# Patient Record
Sex: MALE | Race: BLACK OR AFRICAN AMERICAN | NOT HISPANIC OR LATINO | Employment: FULL TIME | URBAN - METROPOLITAN AREA
[De-identification: names, ages, dates, MRNs, and addresses within clinical notes are randomized per-mention and may not be internally consistent; named-entity substitution may affect disease eponyms.]

---

## 2023-03-28 ENCOUNTER — HOSPITAL ENCOUNTER (OUTPATIENT)
Facility: HOSPITAL | Age: 23
Discharge: HOME | End: 2023-03-28
Attending: FAMILY MEDICINE | Admitting: INTERNAL MEDICINE
Payer: MEDICAID

## 2023-03-28 VITALS
OXYGEN SATURATION: 100 % | SYSTOLIC BLOOD PRESSURE: 131 MMHG | TEMPERATURE: 98 F | HEART RATE: 62 BPM | HEIGHT: 71 IN | RESPIRATION RATE: 18 BRPM | BODY MASS INDEX: 16.82 KG/M2 | DIASTOLIC BLOOD PRESSURE: 75 MMHG | WEIGHT: 120.13 LBS

## 2023-03-28 DIAGNOSIS — D64.9 SYMPTOMATIC ANEMIA: ICD-10-CM

## 2023-03-28 DIAGNOSIS — D64.9 NORMOCYTIC ANEMIA: Primary | ICD-10-CM

## 2023-03-28 DIAGNOSIS — R10.9 ABDOMINAL PAIN: ICD-10-CM

## 2023-03-28 DIAGNOSIS — R07.9 CHEST PAIN: ICD-10-CM

## 2023-03-28 PROBLEM — R31.9 HEMATURIA: Status: ACTIVE | Noted: 2023-03-28

## 2023-03-28 LAB
ABO + RH BLD: NORMAL
ABORH RETYPE: NORMAL
ALBUMIN SERPL-MCNC: 4.4 G/DL (ref 3.5–5)
ALBUMIN/GLOB SERPL: 1.4 RATIO (ref 1.1–2)
ALP SERPL-CCNC: 60 UNIT/L (ref 40–150)
ALT SERPL-CCNC: 17 UNIT/L (ref 0–55)
AMPHET UR QL SCN: NEGATIVE
APPEARANCE UR: ABNORMAL
AST SERPL-CCNC: 22 UNIT/L (ref 5–34)
BACTERIA #/AREA URNS AUTO: ABNORMAL /HPF
BARBITURATE SCN PRESENT UR: NEGATIVE
BASOPHILS # BLD AUTO: 0.06 X10(3)/MCL (ref 0–0.2)
BASOPHILS NFR BLD AUTO: 0.6 %
BENZODIAZ UR QL SCN: NEGATIVE
BILIRUB UR QL STRIP.AUTO: NEGATIVE MG/DL
BILIRUBIN DIRECT+TOT PNL SERPL-MCNC: 0.4 MG/DL
BLD PROD TYP BPU: NORMAL
BLOOD UNIT EXPIRATION DATE: NORMAL
BLOOD UNIT TYPE CODE: 5100
BUN SERPL-MCNC: 18.9 MG/DL (ref 8.9–20.6)
C TRACH DNA SPEC QL NAA+PROBE: NOT DETECTED
CALCIUM SERPL-MCNC: 9.4 MG/DL (ref 8.4–10.2)
CANNABINOIDS UR QL SCN: POSITIVE
CHLORIDE SERPL-SCNC: 104 MMOL/L (ref 98–107)
CK SERPL-CCNC: 160 U/L (ref 30–200)
CO2 SERPL-SCNC: 27 MMOL/L (ref 22–29)
COCAINE UR QL SCN: NEGATIVE
COLOR UR AUTO: ABNORMAL
CREAT SERPL-MCNC: 1.31 MG/DL (ref 0.73–1.18)
CROSSMATCH INTERPRETATION: NORMAL
CTP QC/QA: YES
DEPRECATED CALCIDIOL+CALCIFEROL SERPL-MC: 10.6 NG/ML (ref 30–80)
DISPENSE STATUS: NORMAL
EOSINOPHIL # BLD AUTO: 0.11 X10(3)/MCL (ref 0–0.9)
EOSINOPHIL NFR BLD AUTO: 1.1 %
ERYTHROCYTE [DISTWIDTH] IN BLOOD BY AUTOMATED COUNT: 15.6 % (ref 11.5–17)
FECAL OCCULT BLOOD, POC: NEGATIVE
FENTANYL UR QL SCN: NEGATIVE
FERRITIN SERPL-MCNC: 11.4 NG/ML (ref 21.81–274.66)
FOLATE SERPL-MCNC: 28.5 NG/ML (ref 7–31.4)
GFR SERPLBLD CREATININE-BSD FMLA CKD-EPI: >60 MLS/MIN/1.73/M2
GLOBULIN SER-MCNC: 3.1 GM/DL (ref 2.4–3.5)
GLUCOSE SERPL-MCNC: 107 MG/DL (ref 74–100)
GLUCOSE UR QL STRIP.AUTO: NORMAL MG/DL
GROUP & RH: NORMAL
HCT VFR BLD AUTO: 22.9 % (ref 42–52)
HGB BLD-MCNC: 7.3 G/DL (ref 14–18)
HGB S BLD QL SOLY: POSITIVE
HYALINE CASTS #/AREA URNS LPF: ABNORMAL /LPF
IMM GRANULOCYTES # BLD AUTO: 0.04 X10(3)/MCL (ref 0–0.04)
IMM GRANULOCYTES NFR BLD AUTO: 0.4 %
INDIRECT COOMBS GEL: NORMAL
IRON SATN MFR SERPL: 3 % (ref 20–50)
IRON SERPL-MCNC: 10 UG/DL (ref 65–175)
KETONES UR QL STRIP.AUTO: NEGATIVE MG/DL
LDH SERPL-CCNC: 163 U/L (ref 125–220)
LEUKOCYTE ESTERASE UR QL STRIP.AUTO: 75 UNIT/L
LIPASE SERPL-CCNC: 18 U/L
LYMPHOCYTES # BLD AUTO: 1.33 X10(3)/MCL (ref 0.6–4.6)
LYMPHOCYTES NFR BLD AUTO: 13.7 %
MCH RBC QN AUTO: 25.7 PG (ref 27–31)
MCHC RBC AUTO-ENTMCNC: 31.9 G/DL (ref 33–36)
MCV RBC AUTO: 80.6 FL (ref 80–94)
MDMA UR QL SCN: NEGATIVE
MONOCYTES # BLD AUTO: 0.78 X10(3)/MCL (ref 0.1–1.3)
MONOCYTES NFR BLD AUTO: 8 %
N GONORRHOEA DNA SPEC QL NAA+PROBE: NOT DETECTED
NEUTROPHILS # BLD AUTO: 7.42 X10(3)/MCL (ref 2.1–9.2)
NEUTROPHILS NFR BLD AUTO: 76.2 %
NITRITE UR QL STRIP.AUTO: NEGATIVE
NRBC BLD AUTO-RTO: 0 %
OPIATES UR QL SCN: NEGATIVE
PCP UR QL: NEGATIVE
PH UR STRIP.AUTO: 7 [PH]
PH UR: 7 [PH] (ref 3–11)
PLATELET # BLD AUTO: 303 X10(3)/MCL (ref 130–400)
PMV BLD AUTO: 10.7 FL (ref 7.4–10.4)
POTASSIUM SERPL-SCNC: 3.7 MMOL/L (ref 3.5–5.1)
PROT SERPL-MCNC: 7.5 GM/DL (ref 6.4–8.3)
PROT UR QL STRIP.AUTO: ABNORMAL MG/DL
RBC # BLD AUTO: 2.84 X10(6)/MCL (ref 4.7–6.1)
RBC #/AREA URNS AUTO: >100 /HPF
RBC UR QL AUTO: ABNORMAL UNIT/L
RET# (OHS): 0.05 (ref 0.03–0.1)
RETICULOCYTE COUNT AUTOMATED (OLG): 1.74 % (ref 1.1–2.1)
SODIUM SERPL-SCNC: 139 MMOL/L (ref 136–145)
SP GR UR STRIP.AUTO: 1.01
SPECIFIC GRAVITY, URINE AUTO (.000) (OHS): 1.01 (ref 1–1.03)
SPECIMEN OUTDATE: NORMAL
SQUAMOUS #/AREA URNS LPF: ABNORMAL /HPF
TIBC SERPL-MCNC: 374 UG/DL (ref 69–240)
TIBC SERPL-MCNC: 384 UG/DL (ref 250–450)
TRANSFERRIN SERPL-MCNC: 340 MG/DL (ref 174–364)
UNIDENT CRYS #/AREA URNS HPF: ABNORMAL /HPF
UNIT NUMBER: NORMAL
UROBILINOGEN UR STRIP-ACNC: NORMAL MG/DL
VIT B12 SERPL-MCNC: 715 PG/ML (ref 213–816)
WBC # SPEC AUTO: 9.7 X10(3)/MCL (ref 4.5–11.5)
WBC #/AREA URNS AUTO: ABNORMAL /HPF

## 2023-03-28 PROCEDURE — 83020 HEMOGLOBIN ELECTROPHORESIS: CPT | Mod: 91,90

## 2023-03-28 PROCEDURE — 86900 BLOOD TYPING SEROLOGIC ABO: CPT | Performed by: STUDENT IN AN ORGANIZED HEALTH CARE EDUCATION/TRAINING PROGRAM

## 2023-03-28 PROCEDURE — 87591 N.GONORRHOEAE DNA AMP PROB: CPT | Performed by: FAMILY MEDICINE

## 2023-03-28 PROCEDURE — 63600175 PHARM REV CODE 636 W HCPCS: Performed by: STUDENT IN AN ORGANIZED HEALTH CARE EDUCATION/TRAINING PROGRAM

## 2023-03-28 PROCEDURE — 96365 THER/PROPH/DIAG IV INF INIT: CPT | Mod: 59

## 2023-03-28 PROCEDURE — 36430 TRANSFUSION BLD/BLD COMPNT: CPT

## 2023-03-28 PROCEDURE — P9016 RBC LEUKOCYTES REDUCED: HCPCS

## 2023-03-28 PROCEDURE — 90472 IMMUNIZATION ADMIN EACH ADD: CPT

## 2023-03-28 PROCEDURE — 85025 COMPLETE CBC W/AUTO DIFF WBC: CPT | Performed by: FAMILY MEDICINE

## 2023-03-28 PROCEDURE — 96361 HYDRATE IV INFUSION ADD-ON: CPT

## 2023-03-28 PROCEDURE — 99285 EMERGENCY DEPT VISIT HI MDM: CPT | Mod: 25

## 2023-03-28 PROCEDURE — 90715 TDAP VACCINE 7 YRS/> IM: CPT | Performed by: INTERNAL MEDICINE

## 2023-03-28 PROCEDURE — 80307 DRUG TEST PRSMV CHEM ANLYZR: CPT | Performed by: STUDENT IN AN ORGANIZED HEALTH CARE EDUCATION/TRAINING PROGRAM

## 2023-03-28 PROCEDURE — 81001 URINALYSIS AUTO W/SCOPE: CPT | Mod: 59 | Performed by: FAMILY MEDICINE

## 2023-03-28 PROCEDURE — 82306 VITAMIN D 25 HYDROXY: CPT

## 2023-03-28 PROCEDURE — 25500020 PHARM REV CODE 255

## 2023-03-28 PROCEDURE — 90677 PCV20 VACCINE IM: CPT

## 2023-03-28 PROCEDURE — G0378 HOSPITAL OBSERVATION PER HR: HCPCS

## 2023-03-28 PROCEDURE — 87088 URINE BACTERIA CULTURE: CPT | Performed by: FAMILY MEDICINE

## 2023-03-28 PROCEDURE — 82728 ASSAY OF FERRITIN: CPT | Performed by: STUDENT IN AN ORGANIZED HEALTH CARE EDUCATION/TRAINING PROGRAM

## 2023-03-28 PROCEDURE — 85660 RBC SICKLE CELL TEST: CPT | Performed by: FAMILY MEDICINE

## 2023-03-28 PROCEDURE — 83615 LACTATE (LD) (LDH) ENZYME: CPT | Performed by: FAMILY MEDICINE

## 2023-03-28 PROCEDURE — 25000003 PHARM REV CODE 250: Performed by: FAMILY MEDICINE

## 2023-03-28 PROCEDURE — 83690 ASSAY OF LIPASE: CPT | Performed by: FAMILY MEDICINE

## 2023-03-28 PROCEDURE — 63600175 PHARM REV CODE 636 W HCPCS: Performed by: FAMILY MEDICINE

## 2023-03-28 PROCEDURE — 82607 VITAMIN B-12: CPT | Performed by: FAMILY MEDICINE

## 2023-03-28 PROCEDURE — 96375 TX/PRO/DX INJ NEW DRUG ADDON: CPT

## 2023-03-28 PROCEDURE — 82550 ASSAY OF CK (CPK): CPT | Performed by: STUDENT IN AN ORGANIZED HEALTH CARE EDUCATION/TRAINING PROGRAM

## 2023-03-28 PROCEDURE — 86920 COMPATIBILITY TEST SPIN: CPT

## 2023-03-28 PROCEDURE — 83020 HEMOGLOBIN ELECTROPHORESIS: CPT | Mod: 90

## 2023-03-28 PROCEDURE — 82746 ASSAY OF FOLIC ACID SERUM: CPT | Performed by: FAMILY MEDICINE

## 2023-03-28 PROCEDURE — 85060 BLOOD SMEAR INTERPRETATION: CPT | Performed by: STUDENT IN AN ORGANIZED HEALTH CARE EDUCATION/TRAINING PROGRAM

## 2023-03-28 PROCEDURE — 63600175 PHARM REV CODE 636 W HCPCS

## 2023-03-28 PROCEDURE — 63600175 PHARM REV CODE 636 W HCPCS: Performed by: INTERNAL MEDICINE

## 2023-03-28 PROCEDURE — 83550 IRON BINDING TEST: CPT | Performed by: FAMILY MEDICINE

## 2023-03-28 PROCEDURE — 80053 COMPREHEN METABOLIC PANEL: CPT | Performed by: FAMILY MEDICINE

## 2023-03-28 PROCEDURE — 85045 AUTOMATED RETICULOCYTE COUNT: CPT | Performed by: FAMILY MEDICINE

## 2023-03-28 PROCEDURE — 25000003 PHARM REV CODE 250: Performed by: STUDENT IN AN ORGANIZED HEALTH CARE EDUCATION/TRAINING PROGRAM

## 2023-03-28 PROCEDURE — 85660 RBC SICKLE CELL TEST: CPT | Mod: 91,90

## 2023-03-28 PROCEDURE — 90471 IMMUNIZATION ADMIN: CPT | Performed by: INTERNAL MEDICINE

## 2023-03-28 RX ORDER — GLUCAGON 1 MG
1 KIT INJECTION
Status: DISCONTINUED | OUTPATIENT
Start: 2023-03-28 | End: 2023-03-28 | Stop reason: HOSPADM

## 2023-03-28 RX ORDER — DEXTROSE 40 %
15 GEL (GRAM) ORAL
Status: DISCONTINUED | OUTPATIENT
Start: 2023-03-28 | End: 2023-03-28 | Stop reason: HOSPADM

## 2023-03-28 RX ORDER — DEXTROSE 40 %
30 GEL (GRAM) ORAL
Status: DISCONTINUED | OUTPATIENT
Start: 2023-03-28 | End: 2023-03-28 | Stop reason: HOSPADM

## 2023-03-28 RX ORDER — NALOXONE HCL 0.4 MG/ML
0.02 VIAL (ML) INJECTION
Status: DISCONTINUED | OUTPATIENT
Start: 2023-03-28 | End: 2023-03-28 | Stop reason: HOSPADM

## 2023-03-28 RX ORDER — HYDROCODONE BITARTRATE AND ACETAMINOPHEN 500; 5 MG/1; MG/1
TABLET ORAL
Status: DISCONTINUED | OUTPATIENT
Start: 2023-03-28 | End: 2023-03-28 | Stop reason: HOSPADM

## 2023-03-28 RX ORDER — SODIUM CHLORIDE 0.9 % (FLUSH) 0.9 %
10 SYRINGE (ML) INJECTION EVERY 12 HOURS PRN
Status: DISCONTINUED | OUTPATIENT
Start: 2023-03-28 | End: 2023-03-28 | Stop reason: HOSPADM

## 2023-03-28 RX ORDER — FERROUS SULFATE 325(65) MG
325 TABLET ORAL EVERY OTHER DAY
Qty: 45 TABLET | Refills: 1 | Status: SHIPPED | OUTPATIENT
Start: 2023-03-28 | End: 2023-09-24

## 2023-03-28 RX ORDER — KETOROLAC TROMETHAMINE 30 MG/ML
30 INJECTION, SOLUTION INTRAMUSCULAR; INTRAVENOUS
Status: COMPLETED | OUTPATIENT
Start: 2023-03-28 | End: 2023-03-28

## 2023-03-28 RX ADMIN — PNEUMOCOCCAL 20-VALENT CONJUGATE VACCINE 0.5 ML
2.2; 2.2; 2.2; 2.2; 2.2; 2.2; 2.2; 2.2; 2.2; 2.2; 2.2; 2.2; 2.2; 2.2; 2.2; 2.2; 4.4; 2.2; 2.2; 2.2 INJECTION, SUSPENSION INTRAMUSCULAR at 04:03

## 2023-03-28 RX ADMIN — SODIUM CHLORIDE 125 MG: 9 INJECTION, SOLUTION INTRAVENOUS at 01:03

## 2023-03-28 RX ADMIN — TETANUS TOXOID, REDUCED DIPHTHERIA TOXOID AND ACELLULAR PERTUSSIS VACCINE, ADSORBED 0.5 ML: 5; 2.5; 8; 8; 2.5 SUSPENSION INTRAMUSCULAR at 04:03

## 2023-03-28 RX ADMIN — KETOROLAC TROMETHAMINE 30 MG: 30 INJECTION, SOLUTION INTRAMUSCULAR at 01:03

## 2023-03-28 RX ADMIN — SODIUM CHLORIDE 1000 ML: 9 INJECTION, SOLUTION INTRAVENOUS at 01:03

## 2023-03-28 RX ADMIN — IOHEXOL 100 ML: 350 INJECTION, SOLUTION INTRAVENOUS at 02:03

## 2023-03-28 NOTE — LETTER
March 28, 2023         [address] [facility] [address]  Phone: [phone]  Fax: [phone]       Patient: Tim Pratt   YOB: 2000  Date of Visit: 03/28/2023    To Whom It May Concern:    Amee Pratt  was at [facility] on 03/28/2023. The patient may return to work on 3/29/23 with no restrictions. If you have any questions or concerns, or if I can be of further assistance, please do not hesitate to contact me.    Sincerely,    Lorenza Ramachandran RN

## 2023-03-28 NOTE — ED PROVIDER NOTES
Encounter Date: 3/28/2023       History     Chief Complaint   Patient presents with    Abdominal Pain     Pt reports to the ed c/o abdominal pain since 6 p.m. today. Denies nausea, vomiting, and diarrhea at this time. Ramez hair     21 y/o male presents to the ER with complaints of abdominal pain that began around 6pm last night.  Reports symptoms occurred after having a bowel movement (normal bowel movement).  No dysuria or hematuria.  Patient in left lower abdomen.  Describes as sharp pain.  No associated nausea or vomiting.  No fever or chills.    The history is provided by the patient.   Review of patient's allergies indicates:  No Known Allergies  History reviewed. No pertinent past medical history.  History reviewed. No pertinent surgical history.  History reviewed. No pertinent family history.     Review of Systems   Constitutional:  Negative for chills, fatigue and fever.   HENT:  Negative for ear pain, rhinorrhea and sore throat.    Eyes:  Negative for photophobia and pain.   Respiratory:  Negative for cough, shortness of breath and wheezing.    Cardiovascular:  Negative for chest pain.   Gastrointestinal:  Positive for abdominal pain. Negative for diarrhea, nausea and vomiting.   Genitourinary:  Negative for dysuria.   Neurological:  Negative for dizziness, weakness and headaches.   All other systems reviewed and are negative.    Physical Exam     Initial Vitals [03/28/23 0053]   BP Pulse Resp Temp SpO2   137/78 81 18 97.3 °F (36.3 °C) 100 %      MAP       --         Physical Exam    Nursing note and vitals reviewed.  Constitutional: He appears well-developed and well-nourished.   HENT:   Head: Normocephalic and atraumatic.   Eyes: EOM are normal. Pupils are equal, round, and reactive to light.   Neck: Neck supple.   Normal range of motion.  Cardiovascular:  Normal rate, regular rhythm, normal heart sounds and intact distal pulses.     Exam reveals no gallop and no friction rub.       No murmur  heard.  Pulmonary/Chest: Breath sounds normal. No respiratory distress.   Abdominal: Abdomen is soft. Bowel sounds are normal. He exhibits no distension. There is abdominal tenderness.   Mild left lower quadrant abdominal pain, no rebound or guarding   Musculoskeletal:         General: Normal range of motion.      Cervical back: Normal range of motion and neck supple.     Neurological: He is alert and oriented to person, place, and time. He has normal strength.   Skin: Skin is warm and dry. Capillary refill takes less than 2 seconds.   Psychiatric: He has a normal mood and affect. His behavior is normal. Judgment and thought content normal.       ED Course   Procedures  Labs Reviewed   COMPREHENSIVE METABOLIC PANEL - Abnormal; Notable for the following components:       Result Value    Glucose Level 107 (*)     Creatinine 1.31 (*)     All other components within normal limits   URINALYSIS, REFLEX TO URINE CULTURE - Abnormal; Notable for the following components:    Appearance, UA Turbid (*)     Protein, UA 1+ (*)     Blood, UA 3+ (*)     Leukocyte Esterase, UA 75 (*)     WBC, UA 51-99 (*)     RBC, UA >100 (*)     All other components within normal limits   CBC WITH DIFFERENTIAL - Abnormal; Notable for the following components:    RBC 2.84 (*)     Hgb 7.3 (*)     Hct 22.9 (*)     MCH 25.7 (*)     MCHC 31.9 (*)     MPV 10.7 (*)     All other components within normal limits   LIPASE - Normal   CULTURE, URINE   CHLAMYDIA/GONORRHOEAE(GC), PCR   CBC W/ AUTO DIFFERENTIAL    Narrative:     The following orders were created for panel order CBC Auto Differential.  Procedure                               Abnormality         Status                     ---------                               -----------         ------                     CBC with Differential[[id number]]        Abnormal            Final result                 Please view results for these tests on the individual orders.   EXTRA TUBES    Narrative:     The  following orders were created for panel order EXTRA TUBES.  Procedure                               Abnormality         Status                     ---------                               -----------         ------                     Light Blue Top Hold[[id number]]                              In process                 Red Top Hold[[id number]]                                     In process                 Gold Top Hold[[id number]]                                    In process                   Please view results for these tests on the individual orders.   LIGHT BLUE TOP HOLD   RED TOP HOLD   GOLD TOP HOLD   FOLATE   VITAMIN B12   IRON AND TIBC   LACTATE DEHYDROGENASE   SICKLE CELL SCREEN   RETICULOCYTES   POCT OCCULT BLOOD STOOL          Imaging Results              CT Abdomen Pelvis With Contrast (Preliminary result)  Result time 03/28/23 02:45:35      Preliminary result by Melchor Meyer MD (03/28/23 02:45:35)                   Narrative:    START OF REPORT:  Technique: CT of the abdomen and pelvis was performed with axial images as well as sagittal and coronal reconstruction images with intravenous contrast but without oral contrast.    Comparison: None available.    Clinical History: LLQ abdominal pain; anemia.    Dosage Information: Automated Exposure Control was utilized 114.03 mGy.cm.    Findings:  Technical notes: Note the absence of significant intradermal that decreases sensitivity and specificity for solid organ or bowel inflammatory and infectious processes.  Lines and Tubes: None.  Thorax:  Lungs: The visualized lung bases appear unremarkable.  Pleura: No pleural effusion is seen.  Heart: The heart size is within normal limits.  Abdomen:  Abdominal Wall: No abdominal wall pathology is seen.  Liver: The liver appears unremarkable.  Biliary System: No intrahepatic or extrahepatic biliary duct dilatation is seen.  Gallbladder: The gallbladder appears unremarkable.  Pancreas: The pancreas appears  unremarkable.  Spleen: The spleen appears unremarkable.  Adrenals: The adrenal glands appear unremarkable.  Kidneys: The kidneys appear unremarkable with no stones cysts masses or hydronephrosis.  Aorta: The abdominal aorta appears unremarkable.  IVC: Unremarkable.  Bowel:  Esophagus: The visualized esophagus appears unremarkable.  Stomach: The stomach appears unremarkable.  Duodenum: Unremarkable appearing duodenum.  Small Bowel: The small bowel appears unremarkable.  Colon: Nondistended.  Appendix: The appendix appears unremarkable (series 2 image 60-64).  Peritoneum: No intraperitoneal free air or ascites is seen.    Pelvis:  Bladder: The bladder appears unremarkable.  Male:  Prostate gland: The prostate gland appears unremarkable.    Bony structures:  Dorsal Spine: The visualized dorsal spine appears unremarkable.  Bony Pelvis: The visualized bony structures of the pelvis appear unremarkable.      Impression:  1. No acute intraabdominal or pelvic pathology is identified. Details and findings as discussed. Follow-up as clinically indicated.                          Preliminary result by Interface, Rad Results In (03/28/23 02:45:35)                   Narrative:    START OF REPORT:  Technique: CT of the abdomen and pelvis was performed with axial images as well as sagittal and coronal reconstruction images with intravenous contrast but without oral contrast.    Comparison: None available.    Clinical History: LLQ abdominal pain; anemia.    Dosage Information: Automated Exposure Control was utilized 114.03 mGy.cm.    Findings:  Technical notes: Note the absence of significant intradermal that decreases sensitivity and specificity for solid organ or bowel inflammatory and infectious processes.  Lines and Tubes: None.  Thorax:  Lungs: The visualized lung bases appear unremarkable.  Pleura: No pleural effusion is seen.  Heart: The heart size is within normal limits.  Abdomen:  Abdominal Wall: No abdominal wall  pathology is seen.  Liver: The liver appears unremarkable.  Biliary System: No intrahepatic or extrahepatic biliary duct dilatation is seen.  Gallbladder: The gallbladder appears unremarkable.  Pancreas: The pancreas appears unremarkable.  Spleen: The spleen appears unremarkable.  Adrenals: The adrenal glands appear unremarkable.  Kidneys: The kidneys appear unremarkable with no stones cysts masses or hydronephrosis.  Aorta: The abdominal aorta appears unremarkable.  IVC: Unremarkable.  Bowel:  Esophagus: The visualized esophagus appears unremarkable.  Stomach: The stomach appears unremarkable.  Duodenum: Unremarkable appearing duodenum.  Small Bowel: The small bowel appears unremarkable.  Colon: Nondistended.  Appendix: The appendix appears unremarkable (series 2 image 60-64).  Peritoneum: No intraperitoneal free air or ascites is seen.    Pelvis:  Bladder: The bladder appears unremarkable.  Male:  Prostate gland: The prostate gland appears unremarkable.    Bony structures:  Dorsal Spine: The visualized dorsal spine appears unremarkable.  Bony Pelvis: The visualized bony structures of the pelvis appear unremarkable.      Impression:  1. No acute intraabdominal or pelvic pathology is identified. Details and findings as discussed. Follow-up as clinically indicated.                                         X-Ray Abdomen Flat And Erect (In process)                      Medications   sodium chloride 0.9% bolus 1,000 mL 1,000 mL (0 mLs Intravenous Stopped 3/28/23 0239)   ketorolac injection 30 mg (30 mg Intravenous Given 3/28/23 0139)   iohexoL (OMNIPAQUE 350) 350 mg iodine/mL injection (100 mLs Intravenous Given 3/28/23 0245)     Medical Decision Making:   Initial Assessment:   22-year-old gentleman presents emergency room complaints of left lower quadrant abdominal pain.  Currently appears well in no acute distress.  Only mild tenderness to palpation left lower quadrant, describes as sharp pain.  Will obtain laboratory  evaluation including CBC and CMP for evaluation.  Will also obtain urinalysis in the event that symptoms may be related to ureterolithiasis.  Differential Diagnosis:   Nonspecific abdominal pain, diverticulitis, colitis, ureterolithiasis           ED Course as of 03/28/23 0353   Tue Mar 28, 2023   0202 WBC, UA(!): 51-99 [MW]   0202 Leukocytes, UA(!): 75 [MW]   0202 RBC, UA(!): >100 [MW]   0202 Bacteria, UA: None Seen [MW]   0202 Protein, UA(!): 1+ [MW]   0202 Occult Blood UA(!): 3+ [MW]   0202 WBC: 9.7 [MW]   0202 Hemoglobin(!): 7.3 [MW]   0202 Hematocrit(!): 22.9 [MW]   0202 Platelets: 303 [MW]   0206 Patient noted to be significantly anemic with a hemoglobin of 7.3 and hematocrit of 22.9.  Patient does have red blood cells noted within urinalysis.  On review of patient's medical record, previous hemoglobin hematocrit were normal.  Patient does have hematuria when giving a urine sample today, but reports he is not had hematuria noted in the past. [MW]   0349 No acute abnormalities noted on CT scan.  Patient has a normocytic anemia with a hemoglobin 7.3, hematocrit 22.9, MCV of 80.6.  Fecal occult blood negative.  Normal rectal examination.  Due to significant anemia the patient asymptomatic, internal medicine consulted for observation admission and further evaluation. [MW]   0353 Patient reports resolution of abdominal pain. [MW]      ED Course User Index  [MW] Tee Muñiz MD                 Clinical Impression:   Final diagnoses:  [R10.9] Abdominal pain  [D64.9] Normocytic anemia (Primary)        ED Disposition Condition    Observation Stable                Tee Muñiz MD  03/28/23 0353

## 2023-03-28 NOTE — DISCHARGE SUMMARY
"[facility] Internal Medicine Discharge Summary    Admitting Physician: Bhavesh Mejía MD  Attending Physician: Bhavesh Mejía MD  Date of Admit: 3/28/2023  Date of Discharge: 3/28/2023    Condition: Good  Outcome: Patient tolerated treatment/procedure well without complication and is now ready for discharge.  DISPOSITION: Home or Self Care          Discharge Diagnoses     Patient Active Problem List   Diagnosis    Hematuria    Symptomatic anemia       Principal Problem:  Symptomatic anemia    Consultants and Procedures     Consultants:  IP CONSULT TO HOSPITAL MEDICINE    Procedures:   * No surgery found *     Brief Admission History      Tim Pratt is a 22 y.o. male with no significant past medical history who presented to [facility] ED  on 3/28/2023  with a primary complaint of Abdominal Pain.  Onset 1800, after eating crawfish.  Patient describes left lower abdominal sharp/stabbing pain, constant with radiation to left lower.  Lasted about an hour then self resolved spontaneously.  No associated fever nausea, vomiting, diarrhea, constipation.  Returned at approximately 2200 which prompted patient to come to the ED for evaluation. While in the ED had an episode of hematuria, which has occurred previously.  Reported discomfort during micturition which resulted in hematuria, otherwise no other pain.  He has voided since then without any pain discomfort or hematuria.  At time of my evaluation abdominal resolved. He denies dysuria, urinary frequency, urinary urgency, malodorous urine, hematemesis, hemoptysis, melena, hematochezia, trauma. Patient also reports fatigue for approximately 2 weeks.  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      On initial presentation to ED, afebrile, HR 81, /78, 100% on ambient air. Labs notable for H&H 7.3/22.9, MCV 80.6, Cr. 1.31, UA revealed " "1+proteinuria, 3+blood, Leukocyte esterase 75, pyuria, RBC>100.  CT A&P unrevealing. Received Toradol in 1 L bolus normal saline.  Internal medicine consulted for admission.    Hospital Course with Pertinent Findings     Patient admitted on 3/27/23 for symptomatic anemia. H/H 7.3/22.9 with iron panel revealing iron deficiency anemia. Transfused 1 unit pRBC, followed by 1 dose of Ferrlecit on 3/28/23. LDH, Bili and Retic all wnl. Sickle Cell screen positive (new finding). 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 Patient endorsing aunt with sickle cell disease versus trait. US abdomen revealed normal appearing spleen, splenic artery and vein patent. Prevnar 20 and Tdap were given at time of discharge. [facility] follow up ordered. Hgb electrophoresis and peripheral smear collected and pending results at time of discharge. Patient discharged with ferrous sulfate 325 mg every other day and. Discharged on 3/28/23.    Discharge physical exam:  Vitals  BP: 115/64  Temp: 98.7 °F (37.1 °C)  Temp Source: Oral  Pulse: 73  Resp: 18  SpO2: 100 %  Height: 5' 11" (180.3 cm)  Weight: 54.5 kg (120 lb 1.6 oz)    Physical Exam  Vitals and nursing note reviewed.   Constitutional:       Appearance: Normal appearance.   HENT:      Head: Normocephalic and atraumatic.      Mouth/Throat:      Mouth: Mucous membranes are moist.   Eyes:      General: No scleral icterus.     Extraocular Movements: Extraocular movements intact.      Pupils: Pupils are equal, round, and reactive to light.   Cardiovascular:      Rate and Rhythm: Normal rate and regular rhythm.      Pulses: Normal pulses.      Heart sounds: Normal heart sounds. No murmur heard.  Pulmonary:      Effort: Pulmonary effort is normal. No respiratory distress.      Breath sounds: Normal breath sounds. No wheezing or rales.   Abdominal:      General: Abdomen is flat. There is no distension.      Palpations: " Abdomen is soft.      Tenderness: There is no abdominal tenderness. There is no guarding.   Musculoskeletal:         General: No swelling or tenderness. Normal range of motion.      Cervical back: Normal range of motion.      Right lower leg: No edema.      Left lower leg: No edema.   Skin:     General: Skin is warm.      Capillary Refill: Capillary refill takes less than 2 seconds.      Coloration: Skin is not jaundiced or pale.      Findings: No lesion or rash.   Neurological:      General: No focal deficit present.      Mental Status: He is alert and oriented to person, place, and time. Mental status is at baseline.         TIME SPENT ON DISCHARGE: 60 minutes    Discharge Medications        Medication List        START taking these medications      ferrous sulfate 325 mg (65 mg iron) Tab tablet  Commonly known as: IRON  Take 1 tablet (325 mg total) by mouth every other day.               Where to Get Your Medications        These medications were sent to [facility] [address] [address]      Phone: [phone]   ferrous sulfate 325 mg (65 mg iron) Tab tablet         Discharge Information:   Tim Pratt is being discharged .    Discharge Procedure Orders   Ambulatory referral/consult to Internal Medicine   Standing Status: Future   Referral Priority: Routine Referral Type: Consultation   Referral Reason: Specialty Services Required Referral Location: [facility]   Requested Specialty: Internal Medicine   Number of Visits Requested: 1     Diet Adult Regular     Activity as tolerated        Follow-Up Appointments:   Follow-up Information       [facility] - Emergency Dept Follow up.    Specialty: Emergency Medicine  Why: If symptoms worsen, As needed  Contact information:  [address] [place] [address] [phone]             [facility] - Internal Medicine Follow up in 1 week(s).     Specialty: Internal Medicine  Why: Clinic will call to Schedule appointment, post wards visit  Contact information:  [address] [facility] [address] [phone]                           To address at follow-up:  -The following labs are to be drawn at the [facility] visit: CBC  -The following imaging studies are to be ordered at the [facility] visit: None    The above information was discussed with the patient in clear terms. Patient was able to repeat the instructions to me in their own words. All questions answered. ED precautions provided.    Quentin Thompson MD  [facility] Internal Medicine, HO-1

## 2023-03-28 NOTE — DISCHARGE SUMMARY
"[facility] Internal Medicine Discharge Summary    Admitting Physician: Bhavesh Mejía MD  Attending Physician: Bhavesh Mejía MD  Date of Admit: 3/28/2023  Date of Discharge: 3/28/2023    Condition:  Good  Outcome: Condition has improved and patient is now ready for discharge.  DISPOSITION: Home or Self Care    Discharge Diagnoses     Patient Active Problem List   Diagnosis    Hematuria    Symptomatic anemia       Principal Problem:  Symptomatic anemia    Consultants and Procedures     Consultants:  IP CONSULT TO HOSPITAL MEDICINE    Procedures:   * No surgery found *     Brief Admission History     Tim Pratt is a 22 y.o. male with no significant past medical history who presented to [facility] ED  on 3/28/2023  with a primary complaint of Abdominal Pain.  Onset 1800, after eating crawfish.  Patient describes left lower abdominal sharp/stabbing pain, constant with radiation to left lower.  Lasted about an hour then self resolved spontaneously.  No associated fever nausea, vomiting, diarrhea, constipation.  Returned at approximately 2200 which prompted patient to come to the ED for evaluation. While in the ED had an episode of hematuria, which has occurred previously.  Reported discomfort during micturition which resulted in hematuria, otherwise no other pain.  He has voided since then without any pain discomfort or hematuria.  At time of my evaluation abdominal resolved. He denies dysuria, urinary frequency, urinary urgency, malodorous urine, hematemesis, hemoptysis, melena, hematochezia, trauma. Patient also reports fatigue for approximately 2 weeks.  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      On initial presentation to ED, afebrile, HR 81, /78, 100% on ambient air. Labs notable for H&H 7.3/22.9, MCV 80.6, Cr. 1.31, UA revealed 1+proteinuria, 3+blood, Leukocyte esterase 75, " pyuria, RBC>100.  CT A&P unrevealing. Received Toradol in 1 L bolus normal saline.  Internal medicine consulted for admission.    Hospital Course with Pertinent Findings     During patient's stay he improved after 1 unit of blood, abdominal pain resolved on its own.  Abdominal ultrasound was performed to rule out any spleen pathology, ultrasound showed normal spleen.  Peripheral blood smear still pending at this time.  Patient received a total of 1 unit of blood as well as IV iron prior to discharge and he has been discharged home with iron every other day, he has been told to follow up in [facility] on Monday to review his hemoglobin electrophoresis as his sickle cell screen was positive, patient likely a carrier as he has never had a sickle cell crisis that he is aware of however he was diagnosed with avascular necrosis of the hip when he was 10 years old.  Patient's retic count and LDH were within normal limits as stated above likely not a crisis.  Patient was deemed appropriate for discharge and was discharged on medications listed below.    Patient received Tdap as well as Prevnar 20 prior to discharge.    TIME SPENT ON DISCHARGE: 60 minutes    Discharge Medications        Medication List        START taking these medications      ferrous sulfate 325 mg (65 mg iron) Tab tablet  Commonly known as: IRON  Take 1 tablet (325 mg total) by mouth every other day.               Discharge Information:   Mr. Tim Pratt is being discharged Home or Self Care.    Discharge Procedure Orders   Ambulatory referral/consult to Internal Medicine   Standing Status: Future   Referral Priority: Routine Referral Type: Consultation   Referral Reason: Specialty Services Required Referral Location: [facility]   Requested Specialty: Internal Medicine   Number of Visits Requested: 1     Diet Adult Regular     Activity as tolerated        Follow-Up Appointments:   Follow-up Information       [facility] -  Emergency Dept Follow up.    Specialty: Emergency Medicine  Why: If symptoms worsen, As needed  Contact information:  [address] [facility] [address] [phone]             [facility] - Internal Medicine Follow up in 1 week(s).    Specialty: Internal Medicine  Why: Clinic will call to Schedule appointment, post wards visit  Contact information:  [address] [facility] [address] [phone]                             To address at follow-up:  Please discuss hemoglobin electrophoresis with patient as his sickle cell screen was positive  Please discuss peripheral smear results with patient      The above information was discussed with the patient in clear terms. He was able to repeat the instructions to me in his own words. All questions answered. ED precautions provided.    Marino Joyce MD  Internal Medicine - PGY-2

## 2023-03-28 NOTE — H&P
"[facility] Wards History & Physical Note     Resident Team: [facility] Medicine List 3  Attending Physician: Tee Muñiz MD  Resident: [email or web address]      Date of Admit: 3/28/2023    Chief Complaint     Abdominal Pain     Subjective:      History of Present Illness:  Tim Pratt is a 22 y.o. male with no significant past medical history who presented to [facility] ED  on 3/28/2023  with a primary complaint of Abdominal Pain.  Onset 1800, after eating crawfish.  Patient describes left lower abdominal sharp/stabbing pain, constant with radiation to left lower.  Lasted about an hour then self resolved spontaneously.  No associated fever nausea, vomiting, diarrhea, constipation.  Returned at approximately 2200 which prompted patient to come to the ED for evaluation. While in the ED had an episode of hematuria, which has occurred previously.  Reported discomfort during micturition which resulted in hematuria, otherwise no other pain.  He has voided since then without any pain discomfort or hematuria.  At time of my evaluation abdominal resolved. He denies dysuria, urinary frequency, urinary urgency, malodorous urine, hematemesis, hemoptysis, melena, hematochezia, trauma. Patient also reports fatigue for approximately 2 weeks.  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     On initial presentation to ED, afebrile, HR 81, /78, 100% on ambient air. Labs notable for H&H 7.3/22.9, MCV 80.6, Cr. 1.31, UA revealed 1+proteinuria, 3+blood, Leukocyte esterase 75, pyuria, RBC>100.  CT A&P unrevealing. Received Toradol in 1 L bolus normal saline.  Internal medicine consulted for admission.    PMHx: None, saw PCP (unable to recall his name) in 2021 and reports his labs were normal at that time.  PSurgHx:  None  Family Hx:  Reports both of his parents are healthy. Denies family history of " "anemia  Social Hx:  Consume alcohol for special occasions, smokes marijuana daily, denies tobacco use.  Allergies: NKDA  Medications: None        Review of Systems:  Constitutional: No fever, chills  HENT: Denies headaches  Eyes: Denies vision changes  Cardiovascular: Denies chest pain  Respiratory: Denies cough, pain with breathing  Abdominal: per HPI  : Denies dysuria, urinary frequency, urinary hesitancy, foul-smelling urine  MSK: Denies weakness, pain  Skin: Denies rashes, trauma  Neuro: Denies numbness, tingling, focal deficits  Heme: per HPI           Objective:   Last 24 Hour Vital Signs:  BP  Min: 120/57  Max: 137/78  Temp  Av.3 °F (36.3 °C)  Min: 97.3 °F (36.3 °C)  Max: 97.3 °F (36.3 °C)  Pulse  Av  Min: 81  Max: 81  Resp  Av  Min: 18  Max: 18  SpO2  Av %  Min: 100 %  Max: 100 %  Height  Av' 0.84" (185 cm)  Min: 6' 0.84" (185 cm)  Max: 6' 0.84" (185 cm)  Weight  Av.2 kg (117 lb 4.6 oz)  Min: 53.2 kg (117 lb 4.6 oz)  Max: 53.2 kg (117 lb 4.6 oz)  Body mass index is 15.54 kg/m².  No intake/output data recorded.    Physical Examination:  General: Alert and oriented, no acute distress.   HEENT: Normocephalic, oral mucosa is dry with crackled lips. Pupils are equal, round and reactive to light, extraocular movements are intact, anicteric sclera, conjunctiva pallor.   Neck: Supple, non-tender, no lymphadenopathy, no thyromegaly.   Respiratory: Lungs are clear to auscultation, respirations are non-labored, breath sounds are equal, symmetrical chest wall expansion.   Cardiovascular: Normal rate, regular rhythm, S1-S2 no murmurs/rubs/gallops, no edema, brisk capillary refill.   Gastrointestinal: Soft, non-tender, non-distended, normoactive bowel sounds.  No costovertebral tenderness  Musculoskeletal: Symmetric movement of all extremities with no weakness appreciated  Integumentary: Warm, dry, intact.  No rash or lesions on exposed skin  Neurologic: No focal deficits; Cranial Nerves " II-XII are grossly intact.   Cognition and Speech: Oriented, Speech clear and coherent.   Psychiatric: Cooperative, Appropriate mood & affect.       Laboratory:  Most Recent Data:  CBC:   Lab Results   Component Value Date    WBC 9.7 03/28/2023    HGB 7.3 (L) 03/28/2023    HCT 22.9 (L) 03/28/2023     03/28/2023    MCV 80.6 03/28/2023    RDW 15.6 03/28/2023     WBC Differential:   Recent Labs   Lab 03/28/23 0135   WBC 9.7   HGB 7.3*   HCT 22.9*      MCV 80.6     BMP:   Lab Results   Component Value Date     03/28/2023    K 3.7 03/28/2023    CO2 27 03/28/2023    BUN 18.9 03/28/2023    CREATININE 1.31 (H) 03/28/2023    CALCIUM 9.4 03/28/2023     LFTs:   Lab Results   Component Value Date    ALBUMIN 4.4 03/28/2023    BILITOT 0.4 03/28/2023    AST 22 03/28/2023    ALKPHOS 60 03/28/2023    ALT 17 03/28/2023       DM:   Lab Results   Component Value Date    CREATININE 1.31 (H) 03/28/2023       Urinalysis:   Lab Results   Component Value Date    COLORU Yellow 06/19/2018    PHUA 7.0 03/28/2023    NITRITE Negative 06/19/2018    KETONESU Trace (A) 06/19/2018    UROBILINOGEN Normal 03/28/2023    WBCUA 51-99 (A) 03/28/2023       Trended Lab Data:  Recent Labs   Lab 03/28/23 0135   WBC 9.7   HGB 7.3*   HCT 22.9*      MCV 80.6   RDW 15.6      K 3.7   CO2 27   BUN 18.9   CREATININE 1.31*   ALBUMIN 4.4   BILITOT 0.4   AST 22   ALKPHOS 60   ALT 17         Microbiology Data:  Microbiology Results (last 7 days)       Procedure Component Value Units Date/Time    Urine culture [[id number]] Collected: 03/28/23 0132    Order Status: Sent Specimen: Urine Updated: 03/28/23 0158               Radiology:  Imaging Results              CT Abdomen Pelvis With Contrast (Preliminary result)  Result time 03/28/23 02:45:35      Preliminary result by Melchor Meyer MD (03/28/23 02:45:35)                   Narrative:    START OF REPORT:  Technique: CT of the abdomen and pelvis was performed with axial images as well  as sagittal and coronal reconstruction images with intravenous contrast but without oral contrast.    Comparison: None available.    Clinical History: LLQ abdominal pain; anemia.    Dosage Information: Automated Exposure Control was utilized 114.03 mGy.cm.    Findings:  Technical notes: Note the absence of significant intradermal that decreases sensitivity and specificity for solid organ or bowel inflammatory and infectious processes.  Lines and Tubes: None.  Thorax:  Lungs: The visualized lung bases appear unremarkable.  Pleura: No pleural effusion is seen.  Heart: The heart size is within normal limits.  Abdomen:  Abdominal Wall: No abdominal wall pathology is seen.  Liver: The liver appears unremarkable.  Biliary System: No intrahepatic or extrahepatic biliary duct dilatation is seen.  Gallbladder: The gallbladder appears unremarkable.  Pancreas: The pancreas appears unremarkable.  Spleen: The spleen appears unremarkable.  Adrenals: The adrenal glands appear unremarkable.  Kidneys: The kidneys appear unremarkable with no stones cysts masses or hydronephrosis.  Aorta: The abdominal aorta appears unremarkable.  IVC: Unremarkable.  Bowel:  Esophagus: The visualized esophagus appears unremarkable.  Stomach: The stomach appears unremarkable.  Duodenum: Unremarkable appearing duodenum.  Small Bowel: The small bowel appears unremarkable.  Colon: Nondistended.  Appendix: The appendix appears unremarkable (series 2 image 60-64).  Peritoneum: No intraperitoneal free air or ascites is seen.    Pelvis:  Bladder: The bladder appears unremarkable.  Male:  Prostate gland: The prostate gland appears unremarkable.    Bony structures:  Dorsal Spine: The visualized dorsal spine appears unremarkable.  Bony Pelvis: The visualized bony structures of the pelvis appear unremarkable.      Impression:  1. No acute intraabdominal or pelvic pathology is identified. Details and findings as discussed. Follow-up as clinically indicated.                           Preliminary result by Interface, Rad Results In (03/28/23 02:45:35)                   Narrative:    START OF REPORT:  Technique: CT of the abdomen and pelvis was performed with axial images as well as sagittal and coronal reconstruction images with intravenous contrast but without oral contrast.    Comparison: None available.    Clinical History: LLQ abdominal pain; anemia.    Dosage Information: Automated Exposure Control was utilized 114.03 mGy.cm.    Findings:  Technical notes: Note the absence of significant intradermal that decreases sensitivity and specificity for solid organ or bowel inflammatory and infectious processes.  Lines and Tubes: None.  Thorax:  Lungs: The visualized lung bases appear unremarkable.  Pleura: No pleural effusion is seen.  Heart: The heart size is within normal limits.  Abdomen:  Abdominal Wall: No abdominal wall pathology is seen.  Liver: The liver appears unremarkable.  Biliary System: No intrahepatic or extrahepatic biliary duct dilatation is seen.  Gallbladder: The gallbladder appears unremarkable.  Pancreas: The pancreas appears unremarkable.  Spleen: The spleen appears unremarkable.  Adrenals: The adrenal glands appear unremarkable.  Kidneys: The kidneys appear unremarkable with no stones cysts masses or hydronephrosis.  Aorta: The abdominal aorta appears unremarkable.  IVC: Unremarkable.  Bowel:  Esophagus: The visualized esophagus appears unremarkable.  Stomach: The stomach appears unremarkable.  Duodenum: Unremarkable appearing duodenum.  Small Bowel: The small bowel appears unremarkable.  Colon: Nondistended.  Appendix: The appendix appears unremarkable (series 2 image 60-64).  Peritoneum: No intraperitoneal free air or ascites is seen.    Pelvis:  Bladder: The bladder appears unremarkable.  Male:  Prostate gland: The prostate gland appears unremarkable.    Bony structures:  Dorsal Spine: The visualized dorsal spine appears unremarkable.  Bony Pelvis: The  visualized bony structures of the pelvis appear unremarkable.      Impression:  1. No acute intraabdominal or pelvic pathology is identified. Details and findings as discussed. Follow-up as clinically indicated.                                         X-Ray Abdomen Flat And Erect (In process)                          Assessment & Plan:     Symptomatic normocytic anemia  -On initial presentation H&H 7.3/22.9, MCV 80, in 6/2018 was 16.8/50, 83  -Orthostatic vitals ordered  -Iron profile, ferritin, vitamin B12, folate, LDH, sickle cell screen, reticulocytes, occult blood stool, type and screen, peripheral smear pending  -Will hold off on blood transfusion until initial workup completed      Hx of LLQ abdominal pain with radiation to left lower back  Hematuria  -Patient had one episode of gross hematuria while in the ED, since voided without any reoccurrence  -Abdominal pain resolved with Toradol given in the ED  -CT A&P revealed no acute intraabdominal or pelvic abnormalities   -Suspect patient may have passed a kidney stone  -Consider Urology consult vs outpatient follow up  -CK, UDS, GC/CT, Urine culture pending        CODE STATUS: Full  Access: R. WAYNE  Antibiotics:  None  Diet:  Regular  DVT Prophylaxis:  SCDs  GI Prophylaxis:  None  Fluids:  None      Disposition:  Admit as observation for anemia workup, and possible transfusion pending course          Nalini Cabral MD  [facility] -II

## 2023-03-29 LAB
HEMATOLOGIST REVIEW: NORMAL
HGB A MFR BLD ELPH: 58.5 % (ref 95.8–98)
HGB A2 MFR BLD ELPH: 2.6 % (ref 2–3.3)
HGB F MFR BLD ELPH: 0 % (ref 0–0.9)
HGB FRACT BLD ELPH-IMP: ABNORMAL
HGB FRACT BLD ELPH-IMP: NORMAL
HGB S BLD QL SOLY: POSITIVE
HGB XXX MFR BLD ELPH: ABNORMAL %
M HPLC HB VARIANT, B: ABNORMAL
PROVIDER SIGNING NAME: NORMAL

## 2023-03-30 LAB — BACTERIA UR CULT: NO GROWTH
